# Patient Record
Sex: MALE | Race: WHITE
[De-identification: names, ages, dates, MRNs, and addresses within clinical notes are randomized per-mention and may not be internally consistent; named-entity substitution may affect disease eponyms.]

---

## 2018-12-25 ENCOUNTER — HOSPITAL ENCOUNTER (EMERGENCY)
Dept: HOSPITAL 92 - SCSER | Age: 63
Discharge: HOME | End: 2018-12-25
Payer: MEDICARE

## 2018-12-25 DIAGNOSIS — E78.5: ICD-10-CM

## 2018-12-25 DIAGNOSIS — K21.9: ICD-10-CM

## 2018-12-25 DIAGNOSIS — Z79.84: ICD-10-CM

## 2018-12-25 DIAGNOSIS — Z79.82: ICD-10-CM

## 2018-12-25 DIAGNOSIS — G20: ICD-10-CM

## 2018-12-25 DIAGNOSIS — J20.9: Primary | ICD-10-CM

## 2018-12-25 DIAGNOSIS — E11.9: ICD-10-CM

## 2018-12-25 DIAGNOSIS — I10: ICD-10-CM

## 2018-12-25 DIAGNOSIS — Z79.899: ICD-10-CM

## 2018-12-25 PROCEDURE — 96372 THER/PROPH/DIAG INJ SC/IM: CPT

## 2018-12-25 PROCEDURE — 71046 X-RAY EXAM CHEST 2 VIEWS: CPT

## 2018-12-25 NOTE — RAD
PA AND LATERAL CHEST:

 

HISTORY:

Cough.

 

FINDINGS:

The heart size is normal.  The lungs are well expanded without focal areas of consolidation, pneumoth
orax, or pleural effusions.  There are postop changes in the cervical spine.

 

IMPRESSION:

No acute process.

 

POS: [facility]

## 2019-07-14 ENCOUNTER — HOSPITAL ENCOUNTER (EMERGENCY)
Dept: HOSPITAL 92 - SCSER | Age: 64
Discharge: HOME | End: 2019-07-14
Payer: MEDICARE

## 2019-07-14 DIAGNOSIS — S60.222A: ICD-10-CM

## 2019-07-14 DIAGNOSIS — Z79.84: ICD-10-CM

## 2019-07-14 DIAGNOSIS — E78.5: ICD-10-CM

## 2019-07-14 DIAGNOSIS — Z79.82: ICD-10-CM

## 2019-07-14 DIAGNOSIS — K21.9: ICD-10-CM

## 2019-07-14 DIAGNOSIS — S20.212A: Primary | ICD-10-CM

## 2019-07-14 DIAGNOSIS — E11.9: ICD-10-CM

## 2019-07-14 DIAGNOSIS — I10: ICD-10-CM
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
## 2019-07-14 LAB
ALBUMIN SERPL BCG-MCNC: 4.1 G/DL (ref 3.4–4.8)
ALP SERPL-CCNC: 66 U/L (ref 40–150)
ALT SERPL W P-5'-P-CCNC: (no result) U/L (ref 8–55)
ANION GAP SERPL CALC-SCNC: 15 MMOL/L (ref 10–20)
AST SERPL-CCNC: 15 U/L (ref 5–34)
BASOPHILS # BLD AUTO: 0.1 THOU/UL (ref 0–0.2)
BASOPHILS NFR BLD AUTO: 0.7 % (ref 0–1)
BILIRUB SERPL-MCNC: 0.3 MG/DL (ref 0.2–1.2)
BUN SERPL-MCNC: 24 MG/DL (ref 8.4–25.7)
CALCIUM SERPL-MCNC: 9.4 MG/DL (ref 7.8–10.44)
CHLORIDE SERPL-SCNC: 99 MMOL/L (ref 98–107)
CK SERPL-CCNC: 86 U/L (ref 30–200)
CO2 SERPL-SCNC: 30 MMOL/L (ref 23–31)
CREAT CL PREDICTED SERPL C-G-VRATE: 0 ML/MIN (ref 70–130)
EOSINOPHIL # BLD AUTO: 0.2 THOU/UL (ref 0–0.7)
EOSINOPHIL NFR BLD AUTO: 2.3 % (ref 0–10)
GLOBULIN SER CALC-MCNC: 3.3 G/DL (ref 2.4–3.5)
GLUCOSE SERPL-MCNC: 86 MG/DL (ref 80–115)
HGB BLD-MCNC: 11.6 G/DL (ref 14–18)
LIPASE SERPL-CCNC: 31 U/L (ref 8–78)
LYMPHOCYTES # BLD: 1.5 THOU/UL (ref 1.2–3.4)
LYMPHOCYTES NFR BLD AUTO: 19.1 % (ref 21–51)
MCH RBC QN AUTO: 26.9 PG (ref 27–31)
MCV RBC AUTO: 82.8 FL (ref 78–98)
MONOCYTES # BLD AUTO: 0.6 THOU/UL (ref 0.11–0.59)
MONOCYTES NFR BLD AUTO: 7.7 % (ref 0–10)
NEUTROPHILS # BLD AUTO: 5.5 THOU/UL (ref 1.4–6.5)
NEUTROPHILS NFR BLD AUTO: 70.2 % (ref 42–75)
PLATELET # BLD AUTO: 193 THOU/UL (ref 130–400)
POTASSIUM SERPL-SCNC: 3.7 MMOL/L (ref 3.5–5.1)
RBC # BLD AUTO: 4.33 MILL/UL (ref 4.7–6.1)
SODIUM SERPL-SCNC: 140 MMOL/L (ref 136–145)
WBC # BLD AUTO: 7.8 THOU/UL (ref 4.8–10.8)

## 2019-07-14 PROCEDURE — 96374 THER/PROPH/DIAG INJ IV PUSH: CPT

## 2019-07-14 PROCEDURE — 71260 CT THORAX DX C+: CPT

## 2019-07-14 PROCEDURE — 84484 ASSAY OF TROPONIN QUANT: CPT

## 2019-07-14 PROCEDURE — 74177 CT ABD & PELVIS W/CONTRAST: CPT

## 2019-07-14 PROCEDURE — 93005 ELECTROCARDIOGRAM TRACING: CPT

## 2019-07-14 PROCEDURE — 85025 COMPLETE CBC W/AUTO DIFF WBC: CPT

## 2019-07-14 PROCEDURE — 82550 ASSAY OF CK (CPK): CPT

## 2019-07-14 PROCEDURE — 83690 ASSAY OF LIPASE: CPT

## 2019-07-14 PROCEDURE — 80053 COMPREHEN METABOLIC PANEL: CPT

## 2019-07-14 NOTE — RAD
XR Hand Lt 3 View STANDARD: 7/14/2019 5:32 PM



CLINICAL INDICATION: Injury, pain



COMPARISON: None.



FINDINGS:

Fracture:No fracture.

Arthropathy:Severe osteoarthritis at the first CMC joint. Additional scattered osteoarthritis is pres
ent.

Incidental findings:None of significance.



  

IMPRESSION: 



1. No acute osseous abnormality. 





Reported By: Owen Chau 

Electronically Signed:  7/14/2019 6:47 PM

## 2019-07-14 NOTE — CT
CT ABDOMEN AND PELVIS WITH CONTRAST:

CT THORACIC SPINE WITH CONTRAST:

CT LUMBAR SPINE WITH CONTRAST:

3D  VOLUME RENDERING:



HISTORY:

Fall with injury and pain.



COMPARISON:

None.



FINDINGS:

No consolidation or contusion.  Punctate subpleural nodule of the lateral right upper lobe measures 4
 mm.  Incidental bleb of the left upper lobe.  No   pleural effusion.  No pneumothorax.  No

adenopathy.  No acute process of the mediastinal structures.



Liver:  Unremarkable.

Gallbladder:  Unremarkable.

Pancreas:  Unremarkable

Spleen:  Punctate hypoattenuation, too small to further characterize

Adrenal glands:  Unremarkable.

Kidneys:  No renal calculus or acute  obstruction.  Right renal cysts are present, and there are ad
ditional right renal parenchymal hypodensities, too small to further characterize.

Bowel:  Incompletely assessed without enteric contrast.

Urinary Bladder:  Incompletely distended with bladder wall prominence.

Adenopathy:  No adenopathy within the abdomen or pelvis.

Free Air:  No free air.

Ascites:  No ascites.

Osseous structures:   No acute osseous abnormalities.



IMPRESSION:

1.  No acute posttraumatic sequela.



2.  Additional details are described above.



Transcribed Date/Time: 7/14/2019 7:59 PM



Reported By: Owen Chau 

Electronically Signed:  7/14/2019 8:19 PM

## 2019-07-17 NOTE — EKG
Test Reason : 

Blood Pressure : ***/*** mmHG

Vent. Rate : 073 BPM     Atrial Rate : 073 BPM

   P-R Int : 188 ms          QRS Dur : 088 ms

    QT Int : 394 ms       P-R-T Axes : 057 -10 024 degrees

   QTc Int : 434 ms

 

Normal sinus rhythm

Normal ECG

 

Confirmed by KEVAN MARIN ([id number]),  ODETTE DENTON ([id number]) on 7/17/2019 3:04:49 PM

 

Referred By:             Confirmed By:KEVAN MARIN